# Patient Record
Sex: FEMALE | Race: WHITE | NOT HISPANIC OR LATINO | Employment: PART TIME | URBAN - NONMETROPOLITAN AREA
[De-identification: names, ages, dates, MRNs, and addresses within clinical notes are randomized per-mention and may not be internally consistent; named-entity substitution may affect disease eponyms.]

---

## 2019-12-06 DIAGNOSIS — M25.532 LEFT WRIST PAIN: Primary | ICD-10-CM

## 2019-12-11 ENCOUNTER — APPOINTMENT (OUTPATIENT)
Dept: GENERAL RADIOLOGY | Facility: HOSPITAL | Age: 54
End: 2019-12-11

## 2022-04-06 PROBLEM — G43.909 MIGRAINES: Status: ACTIVE | Noted: 2022-04-06

## 2022-04-06 PROBLEM — K21.9 GERD (GASTROESOPHAGEAL REFLUX DISEASE): Status: ACTIVE | Noted: 2022-04-06

## 2022-04-06 RX ORDER — OMEPRAZOLE 20 MG/1
20 CAPSULE, DELAYED RELEASE ORAL 2 TIMES DAILY
COMMUNITY
End: 2022-04-07 | Stop reason: ALTCHOICE

## 2022-04-06 RX ORDER — TOPIRAMATE 50 MG/1
50 TABLET, FILM COATED ORAL 2 TIMES DAILY
COMMUNITY

## 2022-04-06 RX ORDER — CETIRIZINE HYDROCHLORIDE 10 MG/1
10 TABLET ORAL DAILY
COMMUNITY

## 2022-04-07 ENCOUNTER — OFFICE VISIT (OUTPATIENT)
Dept: CARDIOLOGY | Facility: CLINIC | Age: 57
End: 2022-04-07

## 2022-04-07 VITALS
DIASTOLIC BLOOD PRESSURE: 77 MMHG | HEART RATE: 70 BPM | SYSTOLIC BLOOD PRESSURE: 148 MMHG | HEIGHT: 68 IN | WEIGHT: 200.2 LBS | OXYGEN SATURATION: 99 % | BODY MASS INDEX: 30.34 KG/M2

## 2022-04-07 DIAGNOSIS — R06.02 SHORTNESS OF BREATH: ICD-10-CM

## 2022-04-07 DIAGNOSIS — E78.2 MIXED HYPERLIPIDEMIA: ICD-10-CM

## 2022-04-07 DIAGNOSIS — R07.2 PRECORDIAL PAIN: Primary | ICD-10-CM

## 2022-04-07 DIAGNOSIS — G47.33 OSA (OBSTRUCTIVE SLEEP APNEA): ICD-10-CM

## 2022-04-07 PROBLEM — N30.10 INTERSTITIAL CYSTITIS: Status: ACTIVE | Noted: 2022-04-07

## 2022-04-07 PROCEDURE — 99204 OFFICE O/P NEW MOD 45 MIN: CPT | Performed by: INTERNAL MEDICINE

## 2022-04-07 RX ORDER — CELECOXIB 200 MG/1
200 CAPSULE ORAL 2 TIMES DAILY PRN
COMMUNITY
Start: 2022-02-22

## 2022-04-07 RX ORDER — HYDROXYCHLOROQUINE SULFATE 200 MG/1
200 TABLET, FILM COATED ORAL 2 TIMES DAILY
COMMUNITY
Start: 2022-03-21

## 2022-04-07 RX ORDER — BUPROPION HYDROCHLORIDE 150 MG/1
150 TABLET ORAL DAILY
COMMUNITY
Start: 2022-03-23

## 2022-04-07 RX ORDER — OMEPRAZOLE 40 MG/1
40 CAPSULE, DELAYED RELEASE ORAL DAILY
COMMUNITY
Start: 2022-04-05

## 2022-04-07 RX ORDER — CYCLOBENZAPRINE HCL 5 MG
TABLET ORAL
COMMUNITY
Start: 2022-04-05

## 2022-04-07 RX ORDER — FUROSEMIDE 40 MG/1
TABLET ORAL DAILY
COMMUNITY
Start: 2022-04-05

## 2022-04-07 RX ORDER — UBROGEPANT 100 MG/1
TABLET ORAL
COMMUNITY
Start: 2022-02-07

## 2022-04-07 RX ORDER — POTASSIUM CHLORIDE 750 MG/1
TABLET, FILM COATED, EXTENDED RELEASE ORAL DAILY
COMMUNITY
Start: 2022-04-05

## 2022-04-07 NOTE — PROGRESS NOTES
Subjective   Minerva TITUS is a 56 y.o. female     Chief Complaint   Patient presents with   • Establish Care   • Chest Pain   • Shortness of Breath   • Hyperlipidemia       PROBLEM LIST:     1. Chest Pain  2. Shortness of breath  3. Hyperlipidemia  4. ALFONSO, tested many years ago. Intolerant to machine  5. Interstitial cystitis  6. GERD    Denies Rheumatic / Scarlet fever      Specialty Problems        Cardiology Problems    Migraines                HPI:  Ms. Minerva titus is a 56-year-old female patient of Nicole Pickard seen today for evaluation of bradycardia and abnormal EKG.    The patient has no known cardiovascular disease.  She describes that, despite gaining 30 pounds during isolation with Covid, she still walks 3 to 4 miles every other day without difficulty.  She denies any type of chest discomfort.  Exercise capacity has remained stable over time.  She denies orthopnea, PND, or change in minimal lower extremity edema which is well controlled on chronic low-dose Lasix.  Edema is been present for years.  Ms. titus denies palpitations, dizziness, presyncope or syncope.  She does not claudicate although she describes nocturnal leg cramps.  She has no dizziness, presyncope, or syncope.  She denies any symptoms of TIA or stroke.    Blood pressures have never been an issue for the patient.  We have no lipid data to review.  Interestingly, the patient was recently started on bupropion which is sometimes associated with mild elevation of blood pressures.    EKG demonstrated sinus bradycardia at 50 with limb lead reversal and was otherwise unremarkable.                    PRIOR MEDICATIONS    Current Outpatient Medications on File Prior to Visit   Medication Sig Dispense Refill   • buPROPion XL (WELLBUTRIN XL) 150 MG 24 hr tablet Take 150 mg by mouth Daily.     • celecoxib (CeleBREX) 200 MG capsule Take 200 mg by mouth 2 (Two) Times a Day As Needed.     • cetirizine (zyrTEC) 10 MG tablet Take 10 mg by mouth Daily.      • cyclobenzaprine (FLEXERIL) 5 MG tablet TAKE ONE TABLET BY MOUTH THREE TIMES A DAY AS NEEDED. DO NOT DRIVE AFTER TAKING     • Ergocalciferol (VITAMIN D2 PO) Take 50,000 Units by mouth 1 (One) Time Per Week.     • furosemide (LASIX) 40 MG tablet Daily.     • hydroxychloroquine (PLAQUENIL) 200 MG tablet Take 200 mg by mouth 2 (Two) Times a Day.     • omeprazole (priLOSEC) 40 MG capsule Take 40 mg by mouth Daily.     • pentosan polysulfate (ELMIRON) 100 MG capsule Take 100 mg by mouth 2 (Two) Times a Day.     • potassium chloride 10 MEQ CR tablet Daily.     • topiramate (TOPAMAX) 50 MG tablet Take 50 mg by mouth 2 (Two) Times a Day.     • ubrogepant 100 MG tablet TAKE ONE TABLET BY MOUTH AT ONSET OF HEADACHE. MAY TAKE SECOND DOSE AT LEAST 2 HOURS AFTER FIRST DOSE AS NEEDED. MAX OF 2 TABS IN 24 HOURS.     • [DISCONTINUED] omeprazole (priLOSEC) 20 MG capsule Take 20 mg by mouth 2 (Two) Times a Day.     • [DISCONTINUED] Potassium Bicarb-Citric Acid 10 MEQ effervescent tablet Take  by mouth Daily.       No current facility-administered medications on file prior to visit.       ALLERGIES:    Amoxicillin and Clindamycin/lincomycin    PAST MEDICAL HISTORY:    Past Medical History:   Diagnosis Date   • GERD (gastroesophageal reflux disease)    • Hyperlipidemia    • Interstitial cystitis    • Precordial pain    • Shortness of breath    • Sleep apnea     tested approx. 10 yrs. ago, was intol. to machine       SURGICAL HISTORY:    Past Surgical History:   Procedure Laterality Date   • ANKLE SURGERY     • BACK SURGERY     • CHOLECYSTECTOMY     • HYSTERECTOMY     • NASAL SEPTUM SURGERY         SOCIAL HISTORY:    Social History     Socioeconomic History   • Marital status:    Tobacco Use   • Smoking status: Never Smoker   • Smokeless tobacco: Never Used   Substance and Sexual Activity   • Alcohol use: Never   • Drug use: Never       FAMILY HISTORY:    Family History   Problem Relation Age of Onset   • Hypertension Mother  "   • Heart disease Mother    • Diabetes Mother    • Hypertension Father    • Cancer Father    • Diabetes Father        Review of Systems   Constitutional: Positive for fatigue and unexpected weight change (30 lb. gain r/t covid and eats with stress). Negative for chills, diaphoresis and fever.        Hot flashes   Eyes: Positive for visual disturbance (glasses).   Respiratory: Positive for shortness of breath.         Denies orthopnea/PND   Cardiovascular: Positive for chest pain (r/t reflux) and leg swelling. Negative for palpitations.   Gastrointestinal: Negative for blood in stool (denies melena,hematuria,hemoptysis,hematochezia), constipation and diarrhea.   Endocrine: Positive for heat intolerance (hot flashes). Negative for cold intolerance.   Genitourinary: Negative.  Negative for hematuria.   Musculoskeletal: Positive for arthralgias and myalgias.        Denies leg cramps with ambulation, but has at night   Skin: Negative.    Allergic/Immunologic: Positive for environmental allergies.   Neurological: Positive for headaches (HX migraines).        Denies stroke like sx's   Hematological: Negative.    Psychiatric/Behavioral: Positive for sleep disturbance (off and on).       VISIT VITALS:  Vitals:    04/07/22 0848   BP: 148/77   BP Location: Left arm   Patient Position: Sitting   Pulse: 70   SpO2: 99%   Weight: 90.8 kg (200 lb 3.2 oz)   Height: 172.7 cm (68\")      /77 (BP Location: Left arm, Patient Position: Sitting)   Pulse 70   Ht 172.7 cm (68\")   Wt 90.8 kg (200 lb 3.2 oz)   SpO2 99%   BMI 30.44 kg/m²     RECENT LABS:    Objective       Physical Exam  Vitals and nursing note reviewed.   Constitutional:       General: She is not in acute distress.     Appearance: She is well-developed.   HENT:      Head: Normocephalic and atraumatic.   Eyes:      Conjunctiva/sclera: Conjunctivae normal.      Pupils: Pupils are equal, round, and reactive to light.      Comments: No ptosis or lid lag  Extra ocular " motions intact  JOSSY   Neck:      Vascular: No carotid bruit, hepatojugular reflux or JVD.      Trachea: No tracheal deviation.      Comments: Nl. Carotid upstrokes  Cardiovascular:      Rate and Rhythm: Normal rate and regular rhythm.      Pulses:           Radial pulses are 2+ on the right side and 2+ on the left side.      Heart sounds: Normal heart sounds, S1 normal and S2 normal. No murmur heard.    No friction rub. No S3 or S4 sounds.   Pulmonary:      Effort: Pulmonary effort is normal.      Breath sounds: Normal breath sounds. No wheezing, rhonchi or rales.      Comments: Nl. Expir. Phase  Nl. Breath sound intensity    Abdominal:      General: Bowel sounds are normal. There is no distension or abdominal bruit.      Palpations: Abdomen is soft. There is no mass.      Tenderness: There is no abdominal tenderness. There is no guarding or rebound.      Comments: No organomegaly   Musculoskeletal:         General: No tenderness or deformity. Normal range of motion.      Cervical back: Normal range of motion and neck supple.      Right lower leg: No edema.      Left lower leg: No edema.      Comments: LLE, no edema, palpable pedal pulses  RLE, no edema, palpable pedal pulses   Skin:     General: Skin is warm and dry.      Coloration: Skin is not pale.      Findings: No erythema or rash.   Neurological:      Mental Status: She is alert and oriented to person, place, and time.   Psychiatric:         Behavior: Behavior normal.         Thought Content: Thought content normal.         Judgment: Judgment normal.         Procedures      Assessment/Plan   #1.  Bradycardia.  This is a manifestation of chronic physical condition as the patient has no symptoms of chronotropic incompetence or decreased cardiac reserve.  She also describes no symptoms to suggest hypothyroidism as a cause.  No further evaluation or treatment changes indicated.    2.  Abnormal EKG.  Findings are typical of limb lead reversal with inverted lead  I, he is to N3, and leads aVR and aVL being reversed, and with normal aVF.  Findings are so typical that I do not believe repeat EKG is warranted.    3.  Possible mild systemic hypertension.  The patient has never been told she was hypertensive in the past.  We have asked her to record blood pressures regularly over the next several weeks and will follow up on results and initiate therapy if indicated.    4.  Ms. titus will follow with Nicole Pickard as instructed we will plan on seeing her in follow-up on a as needed basis for blood pressures or for symptoms as discussed in detail today.   Diagnosis Plan   1. Precordial pain     2. Mixed hyperlipidemia     3. Shortness of breath     4. ALFONSO (obstructive sleep apnea)         No follow-ups on file.         Minerva TITUS  reports that she has never smoked. She has never used smokeless tobacco.. I have educated her on the risk of diseases from using tobacco products such as cancer, COPD and heart disease.               Patient's Body mass index is 30.44 kg/m². indicating that she is obese (BMI >30). Obesity-related health conditions include the following: obstructive sleep apnea, dyslipidemias, GERD and interstitial cystitis. Obesity is to be assessed at today's visit. BMI is pcp addressing. We discussed portion control and increasing exercise..             Electronically signed by:    Scribed for Jim Vizcaino MD by Minerva Arteaga LPN on April 7, 2022  at 09:02 EDT    I, Jim Vizcaino MD personally performed the services described in this documentation as scribed by the above named individual in my presence, and it is both accurate and complete. April 7, 2022 09:02 EDT      This note is dictated utilizing voice recognition software.  Although this record has been proof read, transcriptional errors may still be present. If questions occur regarding the content of this record please do not hesitate to call our office.

## 2022-05-02 ENCOUNTER — TELEPHONE (OUTPATIENT)
Dept: CARDIOLOGY | Facility: CLINIC | Age: 57
End: 2022-05-02

## 2022-05-02 NOTE — TELEPHONE ENCOUNTER
PATIENT WAS SEEN 4-7-2022 AND B/P WAS ELEVATED WITH RECOMENDATIONS TO MONITOR AND HOME WITH A F/U CALL FROM OUR OFFICE TO OBTAIN READINGS. PATIENT REPORTS B/P'S MOSTLY RUNNING 125/82, 135-138/80, ONE  SYSTOLIC AND HAS AN OCCAS. SPIKE. INSTRUCTED TO CONTINUE TO MONITOR B/P, WOULD RELAY ABOVE TO DR. DAMON AND TO CALL THE OFFICE WITH ANY ISSUES. VERBALIZED OK. PH,LPN